# Patient Record
(demographics unavailable — no encounter records)

---

## 2024-11-20 NOTE — END OF VISIT
[Time Spent: ___ minutes] : I have spent [unfilled] minutes of time on the encounter which excludes teaching and separately reported services. [FreeTextEntry4] : I Charla Rhodes RN MS am scribing for the presence of Mikaela Coates in the following sections HPI, PMH/family/social history/ROS/VS/Physical exam and disposition. [FreeTextEntry3] : I  personally performed the services described  in the documentation, reviewed the documentation recorded by the scribe in my presence and it accurately and completely records my words and actions.

## 2024-11-24 NOTE — DISCUSSION/SUMMARY
[FreeTextEntry1] : 6 yo female with (+) NB screen, T7616U and 5T- 12TG, CFSPID and rising sweat chloride values, most recently with + sweat test 65/65 mmol/L in august 2024.  51 mmol/L on right arm and 52 mmol/L of chloride on left arm performed at  ~ 2 years ago followed by numerous   Here for follow up.  Doing well, no cough or pulmonary symptoms, clear lung exam. Sputum negative for pseudomonas February 2023 (+) multifocal opacities on CXR with resolution of findings on CXR April 2023 done at .  She has a good appetite and intake but is slim with no evidence or symptoms of malabsorption. She has a Normal fecal elastase (most recent 2023)  plan -sputum culture today -increased calorie supports- paperwork provided -  spirometry- can start to work with our respiratory therapist - meet with the dietitian (FH notable that these children are quite slim as were parents when young. -CXR and labs due - social work support  - Mapping program - review of (+) sweat chloride- answered all parents' questions and addressed concerns. - maintain basic pulmonary regimen as preventive care of lungs -salt supplementation

## 2024-11-24 NOTE — CARE PLAN
[Albuterol] : albuterol [Pulmozyme] : pulmozyme [Vest Percussion] : vest percussion [Times per day: ____] : My goal is to do airway clearance: [unfilled] times per day [4 Quarterly visits with your CF care team] : - 4 quarterly visits with your CF care team [BMI: ___] : - BMI: [unfilled] [At Risk] : - BMI is at risk. (Goal >22 for females and >23 for males) [Vitamins: ___] : - Vitamins: [unfilled] [Supplements/tub feedings: ___] : - Supplements/tub feedings: [unfilled] [Date: ___] : Date: [unfilled] [FreeTextEntry6] : as needed for coughing or illnesses [FreeTextEntry7] : with pulmozyme when sick [FreeTextEntry9] : high calorie options for meals/ snacks- please continue; will set up telehealth with the Dietitian

## 2024-11-24 NOTE — REVIEW OF SYSTEMS
[NI] : Allergic [Nl] : Endocrine [Wgt Gain (___ Kg)] : recent [unfilled] kg weight gain [___Stools per day] : [unfilled] stools per day [Immunizations are up to date] : Immunizations are up to date [Influenza Vaccine this Past Year] : influenza vaccine this past year [Wheezing] : no wheezing [Cough] : no cough [Sputum] : no sputum [Abdominal Pain] : no abdominal pain [Diarrhea] : no diarrhea [Constipation] : no constipation [Oily Stool] : no oily stool [Vomiting] : no vomiting [Abdomen Distention] : abdomen not distended [FreeTextEntry3] : Seen by Optho for r/o amblyopia. 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  [FreeTextEntry2] : influenza vaccine to be done today.

## 2024-11-24 NOTE — PHYSICAL EXAM
[Well Nourished] : well nourished [Well Developed] : well developed [Well Groomed] : well groomed [Alert] : ~L alert [Active] : active [Normal Breathing Pattern] : normal breathing pattern [No Respiratory Distress] : no respiratory distress [No Allergic Shiners] : no allergic shiners [No Drainage] : no drainage [No Conjunctivitis] : no conjunctivitis [Tympanic Membranes Clear] : tympanic membranes were clear [No Sinus Tenderness] : no sinus tenderness [No Oral Pallor] : no oral pallor [Non-Erythematous] : non-erythematous [No Exudates] : no exudates [Tonsil Size ___] : tonsil size [unfilled] [No Tonsillar Enlargement] : no tonsillar enlargement [Absence Of Retractions] : absence of retractions [Symmetric] : symmetric [Good Expansion] : good expansion [No Acc Muscle Use] : no accessory muscle use [Good aeration to bases] : good aeration to bases [Equal Breath Sounds] : equal breath sounds bilaterally [No Crackles] : no crackles [No Rhonchi] : no rhonchi [No Wheezing] : no wheezing [Normal Sinus Rhythm] : normal sinus rhythm [No Heart Murmur] : no heart murmur [Soft, Non-Tender] : soft, non-tender [No Hepatosplenomegaly] : no hepatosplenomegaly [Non Distended] : was not ~L distended [Abdomen Mass (___ Cm)] : no abdominal mass palpated [Full ROM] : full range of motion [No Clubbing] : no clubbing [Capillary Refill < 2 secs] : capillary refill less than two seconds [No Cyanosis] : no cyanosis [No Petechiae] : no petechiae [No Edema] : no edema [No Kyphoscoliosis] : no kyphoscoliosis [No Contractures] : no contractures [Alert and  Oriented] : alert and oriented [No Abnormal Focal Findings] : no abnormal focal findings [Normal Muscle Tone And Reflexes] : normal muscle tone and reflexes [No Birth Marks] : no birth marks [No Rashes] : no rashes [No Skin Lesions] : no skin lesions [FreeTextEntry4] : clear secretions [FreeTextEntry1] : slim, happy, interactive, no cough, appears healthy [FreeTextEntry5] : post pharynx- erythematous

## 2024-11-24 NOTE — PHYSICAL EXAM
[Well Nourished] : well nourished [Well Developed] : well developed [Well Groomed] : well groomed [Alert] : ~L alert [Active] : active [Normal Breathing Pattern] : normal breathing pattern [No Respiratory Distress] : no respiratory distress [No Allergic Shiners] : no allergic shiners [No Drainage] : no drainage [No Conjunctivitis] : no conjunctivitis [Tympanic Membranes Clear] : tympanic membranes were clear [No Sinus Tenderness] : no sinus tenderness [No Oral Pallor] : no oral pallor [Non-Erythematous] : non-erythematous [No Exudates] : no exudates [Tonsil Size ___] : tonsil size [unfilled] [No Tonsillar Enlargement] : no tonsillar enlargement [Absence Of Retractions] : absence of retractions [Symmetric] : symmetric [Good Expansion] : good expansion [No Acc Muscle Use] : no accessory muscle use [Good aeration to bases] : good aeration to bases [Equal Breath Sounds] : equal breath sounds bilaterally [No Crackles] : no crackles [No Rhonchi] : no rhonchi [No Wheezing] : no wheezing [Normal Sinus Rhythm] : normal sinus rhythm [No Heart Murmur] : no heart murmur [Soft, Non-Tender] : soft, non-tender [No Hepatosplenomegaly] : no hepatosplenomegaly [Non Distended] : was not ~L distended [Abdomen Mass (___ Cm)] : no abdominal mass palpated [Full ROM] : full range of motion [No Clubbing] : no clubbing [Capillary Refill < 2 secs] : capillary refill less than two seconds [No Cyanosis] : no cyanosis [No Petechiae] : no petechiae [No Edema] : no edema [No Kyphoscoliosis] : no kyphoscoliosis [No Contractures] : no contractures [Alert and  Oriented] : alert and oriented [No Abnormal Focal Findings] : no abnormal focal findings [Normal Muscle Tone And Reflexes] : normal muscle tone and reflexes [No Birth Marks] : no birth marks [No Rashes] : no rashes [No Skin Lesions] : no skin lesions [FreeTextEntry4] : clear secretions [FreeTextEntry5] : post pharynx- erythematous [FreeTextEntry1] : slim, happy, interactive, no cough, appears healthy

## 2024-11-24 NOTE — DATA REVIEWED
[de-identified] : 8/2024: right: 65mmol/L // left:65 mmol/L 2/2019 29 mmol/L; then 2023~ 51 and 52 mmol/ L ~ 2 years ago  f/b many QNS tests 3/2019 32 mmol/L 8/2019 51 mmoll/L 11/2019 52 mmoll/L Multiple qns specimens at [facility] and [facility] since 1 year of age. [de-identified] : February 2023 CXR had multi focal opacities and RLL opacities - karlos and posterior basilar. treated with antibiotics for 10 days with resolution of findings on CXR April 2023 done at . Peribronchial thickening remains. [de-identified] : Fecal elastase in infancy was normal and repeat 2023 was normal

## 2024-11-24 NOTE — SOCIAL HISTORY
[Mother] : mother [Father] : father [___ Sisters] : [unfilled] sisters [de-identified] : sister born in June; Lori 7 yrs  and Elda( NB)

## 2024-11-24 NOTE — HISTORY OF PRESENT ILLNESS
[Patient] : patient [Mother] : mother [FreeTextEntry1] : 11/21/2024 last seen on 08/21/2024  JOSE ELIAS is 5 years old with CFSPID, (cystic fibrosis screen positive, inconclusive diagnosis) with recent conversion to + sweat test at last visit 8/2024  (+) IRT, [id number]//5T-12TG, and upward trending sweat chloride values, most recent 52mmol/L in Nov 2019. Previously followed at [place] by Dr. Eryn Love.  Father known carrier of [id number] and mother is known to carry the 5T- 12TG since phasing studies were done by Dr. Love and her team.  Interval: Has been well. Pulmozyme increased to daily when the sweat ttest was positive.   PULM: Denies cough or any other pulm symptoms Pulmozyme via neb mask- daily - chest vest prn if develops a cold with cough  GI: Fecal elastase in infancy was normal.  BMI 6 th %. Mother feels she is doing better at finishing her meals- still with a limited variety.  Off Periactin - parents felt it was not helping and thought was more likely to be behavioral issues.   She tends to be a grazer and snacks ~ 6-7 times a day. Parents using Heavy cream, CIB in krystal milk.  eats great in school, Ensure Juice breeze or Ensure Plus. Stools are 1x/day [place] 3-4. No oil noted. (+) 1/4 tsp Iodized salt.  MVI with [facility] daily.  ENT: May 2023: Seen by Dr Sims: Recurrent OM-  Flonase qhs  Social:

## 2024-11-24 NOTE — DISCUSSION/SUMMARY
[FreeTextEntry1] : 4 yo female with (+) NB screen, S5489O and 5T- 12TG, CFSPID and rising sweat chloride values, most recently with + sweat test 65/65 mmol/L in august 2024.  51 mmol/L on right arm and 52 mmol/L of chloride on left arm performed at  ~ 2 years ago followed by numerous   Here for follow up.  Doing well, no cough or pulmonary symptoms, clear lung exam. Sputum negative for pseudomonas February 2023 (+) multifocal opacities on CXR with resolution of findings on CXR April 2023 done at .  She has a good appetite and intake but is slim with no evidence or symptoms of malabsorption. She has a Normal fecal elastase (most recent 2023)  plan -sputum culture today -increased calorie supports- paperwork provided -  spirometry- can start to work with our respiratory therapist - meet with the dietitian (FH notable that these children are quite slim as were parents when young. -CXR and labs due - social work support  - Mapping program - review of (+) sweat chloride- answered all parents' questions and addressed concerns. - maintain basic pulmonary regimen as preventive care of lungs -salt supplementation

## 2024-11-24 NOTE — HISTORY OF PRESENT ILLNESS
[Patient] : patient [Mother] : mother [FreeTextEntry1] : 11/21/2024 last seen on 08/21/2024  JOSE ELIAS is 5 years old with CFSPID, (cystic fibrosis screen positive, inconclusive diagnosis) with recent conversion to + sweat test at last visit 8/2024  (+) IRT, [id number]//5T-12TG, and upward trending sweat chloride values, most recent 52mmol/L in Nov 2019. Previously followed at [place] by Dr. Eryn Love.  Father known carrier of [id number] and mother is known to carry the 5T- 12TG since phasing studies were done by Dr. Love and her team.  Interval: Has been well. Pulmozyme increased to daily when the sweat ttest was positive.   PULM: Denies cough or any other pulm symptoms Pulmozyme via neb mask- daily - chest vest prn if develops a cold with cough  GI: Fecal elastase in infancy was normal.  BMI 6 th %. Mother feels she is doing better at finishing her meals- still with a limited variety.  Off Periactin - parents felt it was not helping and thought was more likely to be behavioral issues.   She tends to be a grazer and snacks ~ 6-7 times a day. Parents using Heavy cream, CIB in krystal milk.  eats great in school, Ensure Juice breeze or Ensure Plus. Stools are 1x/day [place] 3-4. No oil noted. (+) 1/4 tsp Iodized salt.  MVI with [facility] daily.  ENT: May 2023: Seen by Dr Sims: Recurrent OM-  Flonase qhs  Social:

## 2024-11-24 NOTE — DATA REVIEWED
[de-identified] : February 2023 CXR had multi focal opacities and RLL opacities - karlos and posterior basilar. treated with antibiotics for 10 days with resolution of findings on CXR April 2023 done at . Peribronchial thickening remains. [de-identified] : 8/2024: right: 65mmol/L // left:65 mmol/L 2/2019 29 mmol/L; then 2023~ 51 and 52 mmol/ L ~ 2 years ago  f/b many QNS tests 3/2019 32 mmol/L 8/2019 51 mmoll/L 11/2019 52 mmoll/L Multiple qns specimens at [facility] and [facility] since 1 year of age. [de-identified] : Fecal elastase in infancy was normal and repeat 2023 was normal

## 2024-11-24 NOTE — SOCIAL HISTORY
[Mother] : mother [Father] : father [___ Sisters] : [unfilled] sisters [de-identified] : sister born in June; Lori 7 yrs  and Elda( NB)

## 2025-02-27 NOTE — SOCIAL HISTORY
[Mother] : mother [Father] : father [___ Sisters] : [unfilled] sisters [de-identified] : sister born in June; Lori 7 yrs  and Elda( NB)

## 2025-02-27 NOTE — DISCUSSION/SUMMARY
[FreeTextEntry1] : 7 yo female with (+) NB screen, U5968H and 5T- 12TG, CFSPID and rising sweat chloride values, most recently with + sweat test 65/65 mmol/L in august 2024.  Sent genetic mapping blood work today.   Here for follow up.  Doing well, no cough or pulmonary symptoms, clear lung exam. Sputum negative for pseudomonas no evidence or symptoms of malabsorption. She has a Normal fecal elastase (most recent 2023)  plan -sputum culture today - await results from Mapping program -salt supplementation slow weight gain- continue supplements of ensure

## 2025-02-27 NOTE — PHYSICAL EXAM
[Well Nourished] : well nourished [Well Developed] : well developed [Well Groomed] : well groomed [Alert] : ~L alert [Active] : active [Normal Breathing Pattern] : normal breathing pattern [No Respiratory Distress] : no respiratory distress [No Allergic Shiners] : no allergic shiners [No Drainage] : no drainage [No Conjunctivitis] : no conjunctivitis [Tympanic Membranes Clear] : tympanic membranes were clear [Nasal Mucosa Non-Edematous] : nasal mucosa non-edematous [No Sinus Tenderness] : no sinus tenderness [Absence Of Retractions] : absence of retractions [Symmetric] : symmetric [Good Expansion] : good expansion [No Acc Muscle Use] : no accessory muscle use [Good aeration to bases] : good aeration to bases [Equal Breath Sounds] : equal breath sounds bilaterally [No Crackles] : no crackles [No Rhonchi] : no rhonchi [No Wheezing] : no wheezing [Normal Sinus Rhythm] : normal sinus rhythm [Soft, Non-Tender] : soft, non-tender [Abdomen Mass (___ Cm)] : no abdominal mass palpated [Full ROM] : full range of motion [No Clubbing] : no clubbing [Capillary Refill < 2 secs] : capillary refill less than two seconds [No Cyanosis] : no cyanosis [No Petechiae] : no petechiae [No Edema] : no edema [No Kyphoscoliosis] : no kyphoscoliosis [No Contractures] : no contractures [Abnormal Walk] : normal gait [Alert and  Oriented] : alert and oriented [No Abnormal Focal Findings] : no abnormal focal findings [Normal Muscle Tone And Reflexes] : normal muscle tone and reflexes [No Birth Marks] : no birth marks [No Rashes] : no rashes [No Skin Lesions] : no skin lesions [Non-Erythematous] : non-erythematous [FreeTextEntry1] : slim, happy, interactive, no cough

## 2025-02-27 NOTE — SOCIAL HISTORY
[Mother] : mother [Father] : father [___ Sisters] : [unfilled] sisters [de-identified] : sister born in June; Lori 7 yrs  and Elda( NB)

## 2025-02-27 NOTE — HISTORY OF PRESENT ILLNESS
[Patient] : patient [Mother] : mother [FreeTextEntry1] : 02/27/2025 last seen on 11/21/2024  JOSE ELIAS is 6 years old with CFSPID, (cystic fibrosis screen positive, inconclusive diagnosis) with recent conversion to + sweat test 8/2024  (+) IRT, [id number]//5T-12TG   (Previously followed at [place] by Dr. Eryn Love.) Father known carrier of [id number] and mother is known to carry the 5T- 12TG since phasing studies were done by Dr. Love and her team. [employer] program approval- blood being shipped today.   Interval: Unremarkable. -did blood work and mapping blood work this morning prior to the visit   PULM: Denies cough or any other pulm symptoms Pulmozyme via neb mask- daily - chest vest daily  GI: Fecal elastase in infancy was normal.  BMI 4th %.    Off Periactin - parents felt it was not helping and thought was more likely to be behavioral issues.   She tends to be a grazer and snacks ~ 6-7 times a day. Parents using Heavy cream, CIB in krystal milk.  eats great in school, Ensure Juice breeze or Ensure Plus. Stools are 1x/day [place] 4. No oil noted. (+) 1/4 tsp Iodized salt.  MVI with [facility] daily.  ENT: May 2023: Seen by Dr Sims: Recurrent OM-  Flonase qhs  Social: ,

## 2025-02-27 NOTE — HISTORY OF PRESENT ILLNESS
Labs/EKG [Patient] : patient [Mother] : mother [FreeTextEntry1] : 02/27/2025 last seen on 11/21/2024  JOSE ELIAS is 6 years old with CFSPID, (cystic fibrosis screen positive, inconclusive diagnosis) with recent conversion to + sweat test 8/2024  (+) IRT, B1502F//5T-12TG   (Previously followed at [place] by Dr. Eryn Love.) Father known carrier of [id number] and mother is known to carry the 5T- 12TG since phasing studies were done by Dr. Love and her team. [employer] program approval- blood being shipped today.   Interval: Unremarkable. -did blood work and mapping blood work this morning prior to the visit   PULM: Denies cough or any other pulm symptoms Pulmozyme via neb mask- daily - chest vest daily  GI: Fecal elastase in infancy was normal.  BMI 4th %.    Off Periactin - parents felt it was not helping and thought was more likely to be behavioral issues.   She tends to be a grazer and snacks ~ 6-7 times a day. Parents using Heavy cream, CIB in krystal milk.  eats great in school, Ensure Juice breeze or Ensure Plus. Stools are 1x/day [place] 4. No oil noted. (+) 1/4 tsp Iodized salt.  MVI with [facility] daily.  ENT: May 2023: Seen by Dr Sims: Recurrent OM-  Flonase qhs  Social: ,    Labs/EKG/Imaging Studies

## 2025-02-27 NOTE — DATA REVIEWED
[de-identified] : CXR 1/2025- mild increased interstitial markings bilaterally  [de-identified] : 8/2024: right: 65mmol/L // left:65 mmol/L 2/2019 29 mmol/L; then 2023~ 51 and 52 mmol/ L ~ 2 years ago  f/b many QNS tests 3/2019 32 mmol/L 8/2019 51 mmoll/L 11/2019 52 mmoll/L Multiple qns specimens at [facility] and [facility] since 1 year of age. [de-identified] : today FVC-104%, FEV1-96%, FLY62-%     Pulmonary function testing done as part of standard of care for cystic fibrosis to assess lung disease [de-identified] : Fecal elastase in infancy was normal and repeat 2023 was normal

## 2025-02-27 NOTE — CARE PLAN
[Pulmozyme] : pulmozyme [Vest Percussion] : vest percussion [Times per day: ____] : My goal is to do airway clearance: [unfilled] times per day [4 Quarterly visits with your CF care team] : - 4 quarterly visits with your CF care team [BMI: ___] : - BMI: [unfilled] [At Risk] : - BMI is at risk. (Goal >22 for females and >23 for males) [Vitamins: ___] : - Vitamins: [unfilled] [Supplements/tub feedings: ___] : - Supplements/tub feedings: [unfilled] [Date: ___] : Date: [unfilled] [FreeTextEntry6] : albuterol PRN for illness

## 2025-02-27 NOTE — DISCUSSION/SUMMARY
[FreeTextEntry1] : 7 yo female with (+) NB screen, T6091N and 5T- 12TG, CFSPID and rising sweat chloride values, most recently with + sweat test 65/65 mmol/L in august 2024.  Sent genetic mapping blood work today.   Here for follow up.  Doing well, no cough or pulmonary symptoms, clear lung exam. Sputum negative for pseudomonas no evidence or symptoms of malabsorption. She has a Normal fecal elastase (most recent 2023)  plan -sputum culture today - await results from Mapping program -salt supplementation slow weight gain- continue supplements of ensure

## 2025-02-27 NOTE — DATA REVIEWED
[de-identified] : CXR 1/2025- mild increased interstitial markings bilaterally  [de-identified] : 8/2024: right: 65mmol/L // left:65 mmol/L 2/2019 29 mmol/L; then 2023~ 51 and 52 mmol/ L ~ 2 years ago  f/b many QNS tests 3/2019 32 mmol/L 8/2019 51 mmoll/L 11/2019 52 mmoll/L Multiple qns specimens at [facility] and [facility] since 1 year of age. [de-identified] : today FVC-104%, FEV1-96%, ANY01-%     Pulmonary function testing done as part of standard of care for cystic fibrosis to assess lung disease [de-identified] : Fecal elastase in infancy was normal and repeat 2023 was normal

## 2025-05-08 NOTE — DISCUSSION/SUMMARY
[FreeTextEntry1] : 5 yo female with (+) NB screen, P9296S and 5T- 12TG, CFSPID and rising sweat chloride values, most recently with + sweat chloride test: right arm  65 mmol/L and left arm 65 mmol/L in august 2024.  Sent blood to  genetic mapping  program at [facility] that revealed P1240G and 5T- 12TG  Here for follow up.  Doing well, no cough or pulmonary symptoms, clear lung exam. Sputum negative for pseudomonas no evidence or symptoms of malabsorption. She has a Normal fecal elastase (most recent 2023) (+) allergy sx's of watery eyes, post nasal drainage with throat clearing; no  plan -sputum culture today - reviewed results from Mapping program  whichis the same as the results of [facility] NB screen and thus, no change  in care -salt supplementation - slow weight gain- continue supplements of Ensure  - annual CXR and labs were done - follow- up in 3 months  She is eligible for [employer]- must follow up with discussion as this may have a (+) impact pn her BMI

## 2025-05-08 NOTE — SOCIAL HISTORY
[Mother] : mother [Father] : father [___ Sisters] : [unfilled] sisters [de-identified] : sister born in June; Lori 7 yrs  and Elda( NB)

## 2025-05-08 NOTE — PHYSICAL EXAM
[Well Nourished] : well nourished [Well Developed] : well developed [Well Groomed] : well groomed [Alert] : ~L alert [Active] : active [Normal Breathing Pattern] : normal breathing pattern [No Respiratory Distress] : no respiratory distress [No Allergic Shiners] : no allergic shiners [No Drainage] : no drainage [No Conjunctivitis] : no conjunctivitis [Tympanic Membranes Clear] : tympanic membranes were clear [Nasal Mucosa Non-Edematous] : nasal mucosa non-edematous [No Sinus Tenderness] : no sinus tenderness [Non-Erythematous] : non-erythematous [Absence Of Retractions] : absence of retractions [Symmetric] : symmetric [Good Expansion] : good expansion [No Acc Muscle Use] : no accessory muscle use [Good aeration to bases] : good aeration to bases [Equal Breath Sounds] : equal breath sounds bilaterally [No Crackles] : no crackles [No Rhonchi] : no rhonchi [No Wheezing] : no wheezing [Normal Sinus Rhythm] : normal sinus rhythm [Soft, Non-Tender] : soft, non-tender [Abdomen Mass (___ Cm)] : no abdominal mass palpated [Full ROM] : full range of motion [No Clubbing] : no clubbing [Capillary Refill < 2 secs] : capillary refill less than two seconds [No Cyanosis] : no cyanosis [No Petechiae] : no petechiae [No Edema] : no edema [No Kyphoscoliosis] : no kyphoscoliosis [No Contractures] : no contractures [Abnormal Walk] : normal gait [Alert and  Oriented] : alert and oriented [No Abnormal Focal Findings] : no abnormal focal findings [Normal Muscle Tone And Reflexes] : normal muscle tone and reflexes [No Birth Marks] : no birth marks [No Rashes] : no rashes [No Skin Lesions] : no skin lesions [FreeTextEntry1] : slim, happy, interactive, no cough

## 2025-05-08 NOTE — HISTORY OF PRESENT ILLNESS
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 Eryn Love.) Father known carrier of [id number] and mother is known to carry the 5T- 12TG since phasing studies were done by Dr. Love and her team. [employer] program approval- blood being shipped today.   Interval: Itchy eyes, no drainage, occ tearing, denies rhinorrhea, minimal throat clearing cough noted.  no sore throat, no fever.  PMD gave saline eye gtts which helped itchy eyes. No oral allergy medications.  [facility]  MAPPING testing only revealed the same CF Variants: [id number]/ 5T-12TG   PULM: Some throat clearing cough - possible seasonal allergies.  Pulmozyme via neb mask- daily - chest vest daily  GI: Fecal elastase in infancy was normal and repeated at 4 years old and also normal.  BMI 3th %.    Off Periactin - parents felt it did not help and caused fatigue.   She tends to be a grazer and snacks ~ 6-7 times a day. Parents using Heavy cream, CIB in krystal milk.  eats great in school, Ensure Juice breeze or Ensure Plus. Stools are 1x/day [place] 4. No oil noted. (+) 1/4 tsp Iodized salt.  MVI with [place] daily.  ENT: May 2023: Seen by Dr Sims: Recurrent OM- Flonase qhs  Social:  and likes recess the most. being

## 2025-05-08 NOTE — DATA REVIEWED
[Spirometry] : Spirometry [Normal Spirometry] : spirometry normal [de-identified] : CXR 1/2025- mild increased interstitial markings bilaterally  [de-identified] : 8/2024: right: 65mmol/L // left:65 mmol/L 2/2019 29 mmol/L; then 2023~ 51 and 52 mmol/ L ~ 2 years ago  f/b many QNS tests 3/2019 32 mmol/L 8/2019 51 mmoll/L 11/2019 52 mmoll/L Multiple qns specimens at [facility] since 1 year of age. [de-identified] : today FVC-105%, FEV1- 103%, [id number]- 95%     Pulmonary function testing done as part of standard of care for cystic fibrosis to assess lung disease [de-identified] : Fecal elastase in infancy was normal and repeat 2023 was normal